# Patient Record
(demographics unavailable — no encounter records)

---

## 2024-12-25 NOTE — CONSULT LETTER
[Dear  ___] : Dear  [unfilled], [Courtesy Letter:] : I had the pleasure of seeing your patient, [unfilled], in my office today. [( Thank you for referring [unfilled] for consultation for _____ )] : Thank you for referring [unfilled] for consultation for [unfilled] [Please see my note below.] : Please see my note below. [Consult Closing:] : Thank you very much for allowing me to participate in the care of this patient.  If you have any questions, please do not hesitate to contact me. [Sincerely,] : Sincerely, [FreeTextEntry2] : Dr. Ankush Mcadams ([facility])  [FreeTextEntry3] : Tay Abbott MD, FACS\par  , Division of Thoracic Surgery\par  [facility]\par  Thoracic Surgery\par  [facility]\par  Department of Cardiovascular & Thoracic Surgery\par  \par  [employer] [facility] [employer] at [facility]

## 2024-12-25 NOTE — ASSESSMENT
[FreeTextEntry1] : Ms. RENZO HERR, 74 year old female, former smoker (2 ppd x 40 years, quit 2009), w/ hx of HLD, and pulmonary lung nodule s/p right upper lobe wedge resection with Dr. Alex Arroyo on 01/12/2018 (Negative for malignancy).  12/29/22: S/p FB, robotic assisted RLL superior segmentectomy, intercostal nerve block. 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  Now s/p Redo Right VATS, robotic assisted, RUL wedge resection on 02/08/2024. Path of RUL wedge reveals adenocarcinoma, acinar, non-mucinous, well to moderately differentiated microscopically measuring 0.8 x 0.7 cm. All margins negative for tumor. [id number].  - Associated subpleural fibrosis. - PD-L1 (): Negative  She presents today for follow up with imaging.  I have independently reviewed the medical records and imaging at the time of this office consultation.   Recommendations reviewed with patient during this office visit, and all questions answered; Patient instructed on the importance of follow up and verbalizes understanding.

## 2024-12-25 NOTE — HISTORY OF PRESENT ILLNESS
[FreeTextEntry1] : Ms. RENZO HERR, 74 year old female, former smoker (2 ppd x 40 years, quit 2009), w/ hx of HLD, and pulmonary lung nodule s/p right upper lobe wedge resection with Dr. Alex Arroyo on 01/12/2018 (Negative for malignancy).  PFTs on 06/21/2023: FVC 2.81, 102%; FEV1 2.10, 101%; DLCO 17.23, 101%  CT Chest on 07/13/2023 ([facility]): - Mild upper lobe predominant centrilobular emphysema. - There are postsurgical changes of wedge resection in the right lower lobe and right upper lobe. - A 2.1 x 0.8 cm opacity medial to the right upper lobe chain suture (series 10, image 220) is unchanged compared to 4/3/2023 (but increased from 1.6 x 0.9 cm and 7/14/2021 and 1.5 x 0.8 cm on 7/15/2020). - There are a few pulmonary nodules bilaterally which are not significantly changed, reference lesions as below and additional annotated on series 10. *Unchanged 3 mm nodule anteriorly in the right lung apex (series 10, image 116). *Unchanged subtle 4 mm groundglass nodule the right upper lobe posteriorly (series 10, image 136). *Unchanged 2 mm nodule in the left upper lobe (series 10, image 143). - Atherosclerotic calcifications of the aorta.  S/p FB, robotic assisted RLL superior segmentectomy, intercostal nerve block 12/29/22. 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  Discussed with pt regarding SBRT treatment. It usually is reserved for pt who is not surgical candidate. However, in pt's case, RUL nodule looks like a early-stage cancer, surgical resection is still the Firstline treatment for early-stage lung cancer. Therefore, I still recommended pt to go for lung resection.   Now s/p Redo Right VATS, robotic assisted, RUL wedge resection on 02/08/2024. Path of RUL wedge reveals adenocarcinoma, acinar, non-mucinous, well to moderately differentiated microscopically measuring 0.8 x 0.7 cm. All margins negative for tumor. [id number].  - Associated subpleural fibrosis. - PD-L1 (): Negative  CXR on 03/08/2024 ([facility]): - Stable post-op changes  CT Chest on 06/14/2024 ([facility]): - Interval RUL wedge resection for a subsolid lesion seen previously (10:220). The chain suture line is benign in appearance. - Other benign- appearing right upper and RLL chain suture lines.  - A tiny right apical nodular density seen previously is not identified. - Other stable less than or equal to 3 mm nodular densities. - Unchanged mild subpleural reticular scarring in the RLL.  - A few scattered tiny benign coarsely calcified granulomata.  - Underlying emphysema and borderline generalized airway thickening.   CT Chest on 12/20/2024 ([facility]): - Sublobar resections right upper and right lower lobes. - Stable scant calcified and soft tissue micronodules some of which are annotated on series 5.   She presents today for 6 months follow up.

## 2025-01-08 NOTE — ASSESSMENT
[FreeTextEntry1] : Ms. RENZO HERR, 74 year old female, former smoker (2 ppd x 40 years, quit 2009), w/ hx of HLD, and pulmonary lung nodule s/p right upper lobe wedge resection with Dr. Alex Arroyo on 01/12/2018 (Negative for malignancy).  12/29/22: S/p FB, robotic assisted RLL superior segmentectomy, intercostal nerve block. 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  Now s/p Redo Right VATS, robotic assisted, RUL wedge resection on 02/08/2024. Path of RUL wedge reveals adenocarcinoma, acinar, non-mucinous, well to moderately differentiated microscopically measuring 0.8 x 0.7 cm. All margins negative for tumor. [id number].  - Associated subpleural fibrosis. - PD-L1 (): Negative  She presents today for follow up with imaging.  I have independently reviewed the medical records and imaging at the time of this office consultation. CT Chest reviewed with patient, stable post-op change. I recommend she returns to clinic in 6 months with repeat CT Chest without contrast for re-evaluation. She is agreeable.   Recommendations reviewed with patient during this office visit, and all questions answered; Patient instructed on the importance of follow up and verbalizes understanding.    I, ROGER Quiñonez, personally performed the evaluation and management (E/M) services for this established patient. That E/M includes conducting the examination, assessing all new/exacerbated conditions, and establishing a new plan of care. Today, my ACP, Arnulfo Hernández NP, was here to observe my evaluation and management services for this new problem/exacerbated condition to be followed going forward.

## 2025-01-08 NOTE — CONSULT LETTER
[FreeTextEntry2] : Dr. Ankush Mcadams ([facility])  [FreeTextEntry3] : Tay Abbott MD, FACS\par  , Division of Thoracic Surgery\par  [facility]\par  Thoracic Surgery\par  [facility]\par  Department of Cardiovascular & Thoracic Surgery\par  \par  [facility] [employer] at [facility]

## 2025-01-08 NOTE — CONSULT LETTER
[FreeTextEntry2] : Dr. Ankush Mcadams ([facility])  [FreeTextEntry3] : Tay Abbott MD, FACS\par  , Division of Thoracic Surgery\par  [facility]\par  Thoracic Surgery\par  [facility]\par  Department of Cardiovascular & Thoracic Surgery\par  \par  [facility] [employer] at [facility]

## 2025-01-08 NOTE — ASSESSMENT
[FreeTextEntry1] : Ms. RENZO HERR, 74 year old female, former smoker (2 ppd x 40 years, quit 2009), w/ hx of HLD, and pulmonary lung nodule s/p right upper lobe wedge resection with Dr. Alex Arroyo on 01/12/2018 (Negative for malignancy).  12/29/22: S/p FB, robotic assisted RLL superior segmentectomy, intercostal nerve block. 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  Now s/p Redo Right VATS, robotic assisted, RUL wedge resection on 02/08/2024. Path of RUL wedge reveals adenocarcinoma, acinar, non-mucinous, well to moderately differentiated microscopically measuring 0.8 x 0.7 cm. All margins negative for tumor. [id number].  - Associated subpleural fibrosis. - PD-L1 (): Negative  She presents today for follow up with imaging.  I have independently reviewed the medical records and imaging at the time of this office consultation. CT Chest reviewed with patient, stable post-op change. I recommend she returns to clinic in 6 months with repeat CT Chest without contrast for re-evaluation. She is agreeable.   Recommendations reviewed with patient during this office visit, and all questions answered; Patient instructed on the importance of follow up and verbalizes understanding.    I, ROGER Quiñonez, personally performed the evaluation and management (E/M) services for this established patient. That E/M includes conducting the examination, assessing all new/exacerbated conditions, and establishing a new plan of care. Today, my ACP, Arnulfo Hernández NP, was here to observe my evaluation and management services for this new problem/exacerbated condition to be followed going forward.

## 2025-01-08 NOTE — ASSESSMENT
[FreeTextEntry1] : Ms. RENZO HERR, 74 year old female, former smoker (2 ppd x 40 years, quit 2009), w/ hx of HLD, and pulmonary lung nodule s/p right upper lobe wedge resection with Dr. Alex Arroyo on 01/12/2018 (Negative for malignancy).  12/29/22: S/p FB, robotic assisted RLL superior segmentectomy, intercostal nerve block. 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  Now s/p Redo Right VATS, robotic assisted, RUL wedge resection on 02/08/2024. Path of RUL wedge reveals adenocarcinoma, acinar, non-mucinous, well to moderately differentiated microscopically measuring 0.8 x 0.7 cm. All margins negative for tumor. [id number].  - Associated subpleural fibrosis. - PD-L1 (): Negative  She presents today for follow up with imaging.  I have independently reviewed the medical records and imaging at the time of this office consultation. CT Chest reviewed with patient, stable post-op change. I recommend she returns to clinic in 6 months with repeat CT Chest without contrast for re-evaluation. She is agreeable.   Recommendations reviewed with patient during this office visit, and all questions answered; Patient instructed on the importance of follow up and verbalizes understanding.    I, ROGER Quiñonez, personally performed the evaluation and management (E/M) services for this established patient. That E/M includes conducting the examination, assessing all new/exacerbated conditions, and establishing a new plan of care. Today, my ACP, Arnulfo Hernández NP, was here to observe my evaluation and management services for this new problem/exacerbated condition to be followed going forward.

## 2025-01-08 NOTE — CONSULT LETTER
[FreeTextEntry2] : Dr. Ankush Mcadams ([facility])  [FreeTextEntry3] : Tay Abbott MD, FACS\par  , Division of Thoracic Surgery\par  [facility]\par  Thoracic Surgery\par  [facility]\par  Department of Cardiovascular & Thoracic Surgery\par  \par  [employer] [employer] at [facility]

## 2025-01-08 NOTE — HISTORY OF PRESENT ILLNESS
[FreeTextEntry1] : Ms. RENZO HERR, 74 year old female, former smoker (2 ppd x 40 years, quit 2009), w/ hx of HLD, and pulmonary lung nodule s/p right upper lobe wedge resection with Dr. Alex Arroyo on 01/12/2018 (Negative for malignancy).  PFTs on 06/21/2023: FVC 2.81, 102%; FEV1 2.10, 101%; DLCO 17.23, 101%  CT Chest on 07/13/2023 ([facility]): - Mild upper lobe predominant centrilobular emphysema. - There are postsurgical changes of wedge resection in the right lower lobe and right upper lobe. - A 2.1 x 0.8 cm opacity medial to the right upper lobe chain suture (series 10, image 220) is unchanged compared to 4/3/2023 (but increased from 1.6 x 0.9 cm and 7/14/2021 and 1.5 x 0.8 cm on 7/15/2020). - There are a few pulmonary nodules bilaterally which are not significantly changed, reference lesions as below and additional annotated on series 10. *Unchanged 3 mm nodule anteriorly in the right lung apex (series 10, image 116). *Unchanged subtle 4 mm groundglass nodule the right upper lobe posteriorly (series 10, image 136). *Unchanged 2 mm nodule in the left upper lobe (series 10, image 143). - Atherosclerotic calcifications of the aorta.  S/p FB, robotic assisted RLL superior segmentectomy, intercostal nerve block 12/29/22. 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  Discussed with pt regarding SBRT treatment. It usually is reserved for pt who is not surgical candidate. However, in pt's case, RUL nodule looks like a early-stage cancer, surgical resection is still the Firstline treatment for early-stage lung cancer. Therefore, I still recommended pt to go for lung resection.   Now s/p Redo Right VATS, robotic assisted, RUL wedge resection on 02/08/2024. Path of RUL wedge reveals adenocarcinoma, acinar, non-mucinous, well to moderately differentiated microscopically measuring 0.8 x 0.7 cm. All margins negative for tumor. [id number].  - Associated subpleural fibrosis. - PD-L1 (): Negative  CXR on 03/08/2024 ([facility]): - Stable post-op changes  CT Chest on 06/14/2024 ([facility]): - Interval RUL wedge resection for a subsolid lesion seen previously (10:220). The chain suture line is benign in appearance. - Other benign- appearing right upper and RLL chain suture lines.  - A tiny right apical nodular density seen previously is not identified. - Other stable less than or equal to 3 mm nodular densities. - Unchanged mild subpleural reticular scarring in the RLL.  - A few scattered tiny benign coarsely calcified granulomata.  - Underlying emphysema and borderline generalized airway thickening.   CT Chest on 12/20/2024 ([facility]): - Sublobar resections right upper and right lower lobes. - Stable scant calcified and soft tissue micronodules some of which are annotated on series 5.   She presents today for 6 months follow up. Overall, she reports to be feeling well. Denies any chest pain, shortness of breath, cough, or hemoptysis.

## 2025-01-08 NOTE — HISTORY OF PRESENT ILLNESS
[FreeTextEntry1] : Ms. RENZO HERR, 74 year old female, former smoker (2 ppd x 40 years, quit 2009), w/ hx of HLD, and pulmonary lung nodule s/p right upper lobe wedge resection with Dr. Alex Arroyo on 01/12/2018 (Negative for malignancy).  PFTs on 06/21/2023: FVC 2.81, 102%; FEV1 2.10, 101%; DLCO 17.23, 101%  CT Chest on 07/13/2023 ([facility]): - Mild upper lobe predominant centrilobular emphysema. - There are postsurgical changes of wedge resection in the right lower lobe and right upper lobe. - A 2.1 x 0.8 cm opacity medial to the right upper lobe chain suture (series 10, image 220) is unchanged compared to 4/3/2023 (but increased from 1.6 x 0.9 cm and 7/14/2021 and 1.5 x 0.8 cm on 7/15/2020). - There are a few pulmonary nodules bilaterally which are not significantly changed, reference lesions as below and additional annotated on series 10. *Unchanged 3 mm nodule anteriorly in the right lung apex (series 10, image 116). *Unchanged subtle 4 mm groundglass nodule the right upper lobe posteriorly (series 10, image 136). *Unchanged 2 mm nodule in the left upper lobe (series 10, image 143). - Atherosclerotic calcifications of the aorta.  S/p FB, robotic assisted RLL superior segmentectomy, intercostal nerve block 12/29/22. 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  Discussed with pt regarding SBRT treatment. It usually is reserved for pt who is not surgical candidate. However, in pt's case, RUL nodule looks like a early-stage cancer, surgical resection is still the Firstline treatment for early-stage lung cancer. Therefore, I still recommended pt to go for lung resection.   Now s/p Redo Right VATS, robotic assisted, RUL wedge resection on 02/08/2024. Path of RUL wedge reveals adenocarcinoma, acinar, non-mucinous, well to moderately differentiated microscopically measuring 0.8 x 0.7 cm. All margins negative for tumor. [id number].  - Associated subpleural fibrosis. - PD-L1 (): Negative  CXR on 03/08/2024 ([facility]): - Stable post-op changes  CT Chest on 06/14/2024 ([facility]): - Interval RUL wedge resection for a subsolid lesion seen previously (10:220). The chain suture line is benign in appearance. - Other benign- appearing right upper and RLL chain suture lines.  - A tiny right apical nodular density seen previously is not identified. - Other stable less than or equal to 3 mm nodular densities. - Unchanged mild subpleural reticular scarring in the RLL.  - A few scattered tiny benign coarsely calcified granulomata.  - Underlying emphysema and borderline generalized airway thickening.   CT Chest on 12/20/2024 ([facility]): - Sublobar resections right upper and right lower lobes. - Stable scant calcified and soft tissue micronodules some of which are annotated on series 5.   She presents today for 6 months follow up. Overall, she reports to be feeling well. Denies any chest pain, shortness of breath, cough, or hemoptysis.

## 2025-01-08 NOTE — HISTORY OF PRESENT ILLNESS
[FreeTextEntry1] : Ms. RENZO HERR, 74 year old female, former smoker (2 ppd x 40 years, quit 2009), w/ hx of HLD, and pulmonary lung nodule s/p right upper lobe wedge resection with Dr. Alex Arroyo on 01/12/2018 (Negative for malignancy).  PFTs on 06/21/2023: FVC 2.81, 102%; FEV1 2.10, 101%; DLCO 17.23, 101%  CT Chest on 07/13/2023 ([facility]): - Mild upper lobe predominant centrilobular emphysema. - There are postsurgical changes of wedge resection in the right lower lobe and right upper lobe. - A 2.1 x 0.8 cm opacity medial to the right upper lobe chain suture (series 10, image 220) is unchanged compared to 4/3/2023 (but increased from 1.6 x 0.9 cm and 7/14/2021 and 1.5 x 0.8 cm on 7/15/2020). - There are a few pulmonary nodules bilaterally which are not significantly changed, reference lesions as below and additional annotated on series 10. *Unchanged 3 mm nodule anteriorly in the right lung apex (series 10, image 116). *Unchanged subtle 4 mm groundglass nodule the right upper lobe posteriorly (series 10, image 136). *Unchanged 2 mm nodule in the left upper lobe (series 10, image 143). - Atherosclerotic calcifications of the aorta.  S/p FB, robotic assisted RLL superior segmentectomy, intercostal nerve block 12/29/22. 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  Discussed with pt regarding SBRT treatment. It usually is reserved for pt who is not surgical candidate. However, in pt's case, RUL nodule looks like a early-stage cancer, surgical resection is still the Firstline treatment for early-stage lung cancer. Therefore, I still recommended pt to go for lung resection.   Now s/p Redo Right VATS, robotic assisted, RUL wedge resection on 02/08/2024. Path of RUL wedge reveals adenocarcinoma, acinar, non-mucinous, well to moderately differentiated microscopically measuring 0.8 x 0.7 cm. All margins negative for tumor. [id number].  - Associated subpleural fibrosis. - PD-L1 (): Negative  CXR on 03/08/2024 ([facility]): - Stable post-op changes  CT Chest on 06/14/2024 ([facility]): - Interval RUL wedge resection for a subsolid lesion seen previously (10:220). The chain suture line is benign in appearance. - Other benign- appearing right upper and RLL chain suture lines.  - A tiny right apical nodular density seen previously is not identified. - Other stable less than or equal to 3 mm nodular densities. - Unchanged mild subpleural reticular scarring in the RLL.  - A few scattered tiny benign coarsely calcified granulomata.  - Underlying emphysema and borderline generalized airway thickening.   CT Chest on 12/20/2024 ([facility]): - Sublobar resections right upper and right lower lobes. - Stable scant calcified and soft tissue micronodules some of which are annotated on series 5.   She presents today for 6 months follow up. Overall, she reports to be feeling well. Denies any chest pain, shortness of breath, cough, or hemoptysis.